# Patient Record
Sex: FEMALE | Race: WHITE
[De-identification: names, ages, dates, MRNs, and addresses within clinical notes are randomized per-mention and may not be internally consistent; named-entity substitution may affect disease eponyms.]

---

## 2019-06-16 ENCOUNTER — HOSPITAL ENCOUNTER (EMERGENCY)
Dept: HOSPITAL 88 - FSED | Age: 74
Discharge: HOME | End: 2019-06-16
Payer: MEDICARE

## 2019-06-16 VITALS — DIASTOLIC BLOOD PRESSURE: 71 MMHG | SYSTOLIC BLOOD PRESSURE: 143 MMHG

## 2019-06-16 VITALS — BODY MASS INDEX: 26.75 KG/M2 | WEIGHT: 151 LBS | HEIGHT: 63 IN

## 2019-06-16 DIAGNOSIS — Z88.5: ICD-10-CM

## 2019-06-16 DIAGNOSIS — Z88.8: ICD-10-CM

## 2019-06-16 DIAGNOSIS — R05: Primary | ICD-10-CM

## 2019-06-16 PROCEDURE — 71046 X-RAY EXAM CHEST 2 VIEWS: CPT

## 2019-06-16 PROCEDURE — 99283 EMERGENCY DEPT VISIT LOW MDM: CPT

## 2019-06-16 NOTE — DIAGNOSTIC IMAGING REPORT
EXAMINATION: PA and lateral views of the chest.



COMPARISON: None



CLINICAL HISTORY:  Cough

     

DISCUSSION:



Lines/tubes:  None.



Lungs:  The lungs are well inflated and clear. No pneumonia or pulmonary edema.



Pleura:  No pleural effusion or pneumothorax.



Heart and mediastinum:  The cardiomediastinal silhouette is normal.



Bones and soft tissues:  No acute bony abnormalities.  



IMPRESSION: 

No acute cardiopulmonary abnormalities.











Signed by: Dr. Andrew Palisch, M.D. on 6/16/2019 11:28 AM

## 2019-06-16 NOTE — XMS REPORT
Clinical Summary

                             Created on: 2019



Maria ElenaSonia olmsteadlin Aguilar

External Reference #: [id number]

: 1945

Sex: Female



Demographics







                          Address                   [address]  [address]

 

                          Home Phone                [phone]

 

                          Preferred Language        English

 

                          Marital Status            Unknown

 

                          Sikh Affiliation     Jainism

 

                          Race                      White

 

                          Ethnic Group              Non-





Author







                          Author                    [facility]

 

                          Address                   Unknown

 

                          Phone                     Unavailable







Support







                Name            Relationship    Address         Phone

 

                    Reinaldo Arredondo       [employer]                [address]  [address]                     [phone]







Care Team Providers







                    Care Team Member Name    Role                Phone

 

                    NazarioCésarTrever W     PCP                 [phone]







Allergies







                                        Comments



                 Active Allergy     Reactions       Severity        Noted Date 

 

                                        



"mess up my bones"



                           Alendronate               2018 

 

                                        



hives



                           Esomeprazole Magnesium       2018 

 

                                         



                           Oxycodone                 2018 



                                         Hcl-Oxycodone-Asa    







Medications







                          End Date                  Status



              Medication     Sig          Dispensed     Refills      Start  



                                         Date  

 

                                                    Active



                     atorvastatin (LIPITOR) 40     Take 40 mg by       0   



                           MG tablet                 mouth daily.     

 

                                                    Active



                     cholecalciferol, vitamin     Take by mouth       0   



                           D3, 2,000 unit Cap        2 (two) times     



                                         daily.     

 

                                                    Active



                     clopidogrel (PLAVIX) 75     Take 75 mg by       0   



                           mg tablet                 mouth daily.     

 

                                                    Active



                     MULTIVITAMIN/IRON/FOLIC     Take by mouth       0   



                           ACID (CENTRUM COMPLETE     daily.     



                                         ORAL)      

 

                                                    Active



                     CALCIUM/D3/MAG      Take by mouth       0   



                           OX//JOSÉ MIGUEL/ZN (CALTRATE     daily.     



                                         + D3 PLUS MINERALS ORAL)      

 

                                                    Active



                     loratadine (CLARITIN) 10     Take 10 mg by       0   



                           mg tablet                 mouth daily.     

 

                                                    Active



                     ezetimibe (ZETIA) 10 mg     Take 10 mg by       0   



                           tablet                    mouth daily.     

 

                                                    Active



                     montelukast (SINGULAIR)     Take 10 mg by       0   



                           10 mg tablet              mouth     



                                         nightly.     

 

                                                    Active



                     fluticasone (FLOVENT     Inhale 1 puff       0   



                           DISKUS) 50 mcg/actuation     by mouth via     



                           diskus inhaler            inhaler 2     



                                         (two) times     



                                         daily.     







Active Problems







 



                           Problem                   Noted Date

 

 



                           Carotid artery stenosis left ICA 70-99% by Doppler and 80% by CTA; Right     2018





                                         ICA 16-49% by doppler, 40% by CTA 2018 

 

 



                                         Atherosclerosis of artery 

 

 



                                         Pure hypercholesterolemia 

 

 



                                         TIA (transient ischemic attack) 







Social History







                                        Date



                 Tobacco Use     Types           Packs/Day       Years Used 

 

                                         



                                         Never Smoker    









   



                 Alcohol Use     Drinks/Week     oz/Week         Comments

 

   



                                         No   









 



                           Sex Assigned at Birth     Date Recorded

 

 



                                         Not on file 









                                        Industry



                           Job Start Date            Occupation 

 

                                        Not on file



                           Not on file               Not on file 









                                        Travel End



                           Travel History            Travel Start 

 





                                         No recent travel history available.







Last Filed Vital Signs

Not on file



Plan of Treatment





Not on file



Procedures







                                        Comments



                 Procedure Name     Priority        Date/Time       Associated Diagnosis 

 

                                         



                           VASCULAR DIAGRAM -SCAN      2018  



                                         10:51 AM CDT  



after 06/15/2018



Results

* VASCULAR DIAGRAM -SCAN (2018 10:51 AM CDT)





 



                           Narrative                 Performed At

 

 



                                         This result has an attachment that is not available. 





after 06/15/2018



Insurance







     



            Payer      Benefit     Subscriber ID     Type       Phone      Address



                                         Plan /    



                                         Group    

 

     



                     [facility]   



                                         MEDIC[facility]    



                                         ADV    









     



            Guarantor Name     Account     Relation to     Date of     Phone      Billing Address



                     Type                Patient             Birth  

 

     



            Caitlyn Arredondo     Personal/F     Self       1945     [phone]



                     [facility]               (Home)              [place] [address]







Advance Directives





For more information, please contact:



[facility]



[address]



[place] [address] [phone]









                          Date Inactivated          Comments



                           Code Status               Date Activated  

 

                          2018 10:21 PM          



                           Full Code                 2018  8:04 AM  









  



                           This code status was determined by:     Patient

## 2022-03-05 ENCOUNTER — HOSPITAL ENCOUNTER (EMERGENCY)
Dept: HOSPITAL 88 - FSED | Age: 77
Discharge: HOME | End: 2022-03-05
Payer: MEDICARE

## 2022-03-05 VITALS — BODY MASS INDEX: 27.2 KG/M2 | WEIGHT: 153.5 LBS | HEIGHT: 63 IN

## 2022-03-05 DIAGNOSIS — I10: ICD-10-CM
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
## 2022-03-05 DIAGNOSIS — F41.9: ICD-10-CM

## 2022-03-05 DIAGNOSIS — R07.89: Primary | ICD-10-CM
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
## 2022-03-05 DIAGNOSIS — E78.5: ICD-10-CM
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
## 2022-03-05 DIAGNOSIS — I48.91: ICD-10-CM

## 2022-03-05 PROCEDURE — 99283 EMERGENCY DEPT VISIT LOW MDM: CPT

## 2022-03-05 PROCEDURE — 71250 CT THORAX DX C-: CPT
